# Patient Record
(demographics unavailable — no encounter records)

---

## 2024-12-19 NOTE — PHYSICAL EXAM
[Well Developed] : well developed [Well Nourished] : well nourished [No Acute Distress] : no acute distress [Normal Conjunctiva] : normal conjunctiva [Normal Venous Pressure] : normal venous pressure [No Carotid Bruit] : no carotid bruit [Normal S1, S2] : normal S1, S2 [No Murmur] : no murmur [No Rub] : no rub [No Gallop] : no gallop [Clear Lung Fields] : clear lung fields [Good Air Entry] : good air entry [No Respiratory Distress] : no respiratory distress  [Soft] : abdomen soft [Non Tender] : non-tender [No Masses/organomegaly] : no masses/organomegaly [Normal Bowel Sounds] : normal bowel sounds [Normal Gait] : normal gait [No Edema] : no edema [No Cyanosis] : no cyanosis [No Clubbing] : no clubbing [No Varicosities] : no varicosities [No Rash] : no rash [No Skin Lesions] : no skin lesions [Moves all extremities] : moves all extremities [No Focal Deficits] : no focal deficits [Normal Speech] : normal speech [Alert and Oriented] : alert and oriented [Normal memory] : normal memory [de-identified] : questionable marfanoid features  [de-identified] : mitral valve click  [de-identified] : nevi

## 2024-12-19 NOTE — HISTORY OF PRESENT ILLNESS
[FreeTextEntry1] : This is a 21 y.o M with PMHx s/p chronic pelvis pain syndrome and PSH of L shoulder arthroscopic posterior glenoid labral repair/capsulorrhaphy 8/14/2023 presents today to establish primary care.Patient states that he is feeling well today. No complaints. Patient referred by family members.  Denies chest pain, palpitations, diaphoresis, vision changes, HA, dizziness, syncope, cough, wheezing, edema, fever, chills, infection.pt with chronic prostatitis intermittent

## 2025-04-14 NOTE — HISTORY OF PRESENT ILLNESS
[Never] : never [TextBox_4] : JAIMEE GLEASON is a 21 year old male who presents with cough  Cough for 1 week.  Temperature subjective to 3 days ago.  Unaware of sick contact.  dad is ENT.  Started  Z-Vishnu.  No shortness of breath.  No known wheezing.  Not on chronic bronchodilator.  Has some postnasal drip.

## 2025-04-14 NOTE — PROCEDURE
[FreeTextEntry1] : PA and lateral chest xray performed using standard projections. Bones and soft tissues structures are unremarkable.Cardiac silhouette appears normal. Lung fields are clear. No infiltrate or masses noted. Mediastinal contours are normal. IMPRESSION: no acute cardiopulmonary disease  Viral panel done